# Patient Record
Sex: FEMALE | Race: BLACK OR AFRICAN AMERICAN
[De-identification: names, ages, dates, MRNs, and addresses within clinical notes are randomized per-mention and may not be internally consistent; named-entity substitution may affect disease eponyms.]

---

## 2017-01-25 ENCOUNTER — HOSPITAL ENCOUNTER (EMERGENCY)
Dept: HOSPITAL 61 - ER | Age: 22
Discharge: HOME | End: 2017-01-25
Payer: COMMERCIAL

## 2017-01-25 VITALS — SYSTOLIC BLOOD PRESSURE: 124 MMHG | DIASTOLIC BLOOD PRESSURE: 65 MMHG

## 2017-01-25 DIAGNOSIS — L02.411: Primary | ICD-10-CM

## 2017-01-25 PROCEDURE — 99283 EMERGENCY DEPT VISIT LOW MDM: CPT

## 2017-01-25 NOTE — PHYS DOC
Past Medical History


Past Medical History:  No Pertinent History


Past Surgical History:  


Alcohol Use:  None


Drug Use:  None





Adult General


Chief Complaint


Chief Complaint:  ABSCESS





HPI


HPI


Patient is a 21  year old female who presents with an abscess of the right and 

slept that she noted 2 days ago. Patient states she has tried applying warm 

compresses to the area. Patient denies any fever.





Review of Systems


Review of Systems





Constitutional: See history of present illness


Musculoskeletal: Denies back pain or joint pain []


Integument: Abscess to the right axilla


Neurologic: Denies headache, focal weakness or sensory changes []


Endocrine: Denies polyuria or polydipsia []





Allergies


Allergies





 Allergies








Coded Allergies Type Severity Reaction Last Updated Verified


 


  No Known Drug Allergies    16 No











Physical Exam


Physical Exam





Constitutional: Well developed, well nourished, no acute distress, non-toxic 

appearance. []


Skin: Right axilla with an area of induration approximately 1 x 1 cm, the area 

has redness its warm to touch tender but not fluctuant.


Back: No tenderness, no CVA tenderness. [] 


Extremities: No tenderness, no cyanosis, no clubbing, ROM intact, no edema. [] 


Neurologic: Alert and oriented X 3, normal motor function, normal sensory 

function, no focal deficits noted. []


Psychologic: Affect normal, judgement normal, mood normal. []





Current Patient Data


Vital Signs





 Vital Signs








  Date Time  Temp Pulse Resp B/P Pulse Ox O2 Delivery O2 Flow Rate FiO2


 


17 22:43 98.3 73 18  99 Room Air  





 98.3       











EKG


EKG


[]





Radiology/Procedures


Radiology/Procedures


[]





Course & Med Decision Making


Course & Med Decision Making


Pertinent Labs and Imaging studies reviewed. (See chart for details)





Patient is an abscess with cellulitis on the right axilla, the abscess is not 

ready to be drained. Recommended she continues doing the warm compresses. 

Discharged Bactrim. Follow-up with her own PCP or the provided doctor in a 

week. Instructed to return to the ED if symptoms worsen or she has any 

concerning symptoms.





Dragon Disclaimer


Dragon Disclaimer


This electronic medical record was generated, in whole or in part, using a 

voice recognition dictation system.





Departure


Departure


Impression:  


 Primary Impression:  


 Cellulitis and abscess of upper arm and forearm


Disposition:  01 HOME, SELF-CARE


Condition:  STABLE


Referrals:  


TERRA PALACIOS MD (PCP)


Follow-up with your own doctor in one week


Patient Instructions:  Abscess





Additional Instructions:


You have an abscess of the right axilla, continue applying warm compresses to 

the area twice a day. Take the prescribed antibiotics until they're finished. 

Come back to the emergency room if you develop any concerning symptoms or the 

current symptoms worsen.


Scripts


Sulfamethoxazole/Trimethoprim (Bactrim Ds Tablet)1 Each Tablet1 Tab PO BID #20 

TAB


   Prov:RAHEEL LAMAR         17








RAHEEL LAMAR 2017 22:57

## 2018-10-06 ENCOUNTER — HOSPITAL ENCOUNTER (EMERGENCY)
Dept: HOSPITAL 61 - ER | Age: 23
Discharge: HOME | End: 2018-10-06
Payer: SELF-PAY

## 2018-10-06 VITALS — WEIGHT: 130 LBS | HEIGHT: 63 IN | BODY MASS INDEX: 23.04 KG/M2

## 2018-10-06 VITALS — DIASTOLIC BLOOD PRESSURE: 75 MMHG | SYSTOLIC BLOOD PRESSURE: 114 MMHG
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
## 2018-10-06 DIAGNOSIS — Z98.890: ICD-10-CM

## 2018-10-06 DIAGNOSIS — S05.01XA: ICD-10-CM

## 2018-10-06 DIAGNOSIS — S05.11XA: Primary | ICD-10-CM

## 2018-10-06 PROCEDURE — 99284 EMERGENCY DEPT VISIT MOD MDM: CPT

## 2018-10-06 PROCEDURE — 90471 IMMUNIZATION ADMIN: CPT

## 2018-10-06 PROCEDURE — 90715 TDAP VACCINE 7 YRS/> IM: CPT
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
